# Patient Record
Sex: FEMALE | Race: WHITE | NOT HISPANIC OR LATINO | Employment: FULL TIME | URBAN - METROPOLITAN AREA
[De-identification: names, ages, dates, MRNs, and addresses within clinical notes are randomized per-mention and may not be internally consistent; named-entity substitution may affect disease eponyms.]

---

## 2018-12-01 ENCOUNTER — HOSPITAL ENCOUNTER (EMERGENCY)
Facility: HOSPITAL | Age: 58
Discharge: HOME | End: 2018-12-01
Attending: EMERGENCY MEDICINE | Admitting: EMERGENCY MEDICINE

## 2018-12-01 ENCOUNTER — APPOINTMENT (EMERGENCY)
Dept: RADIOLOGY | Facility: HOSPITAL | Age: 58
End: 2018-12-01

## 2018-12-01 ENCOUNTER — APPOINTMENT (EMERGENCY)
Dept: CT IMAGING | Facility: HOSPITAL | Age: 58
End: 2018-12-01

## 2018-12-01 VITALS
OXYGEN SATURATION: 97 % | SYSTOLIC BLOOD PRESSURE: 107 MMHG | HEART RATE: 59 BPM | RESPIRATION RATE: 16 BRPM | DIASTOLIC BLOOD PRESSURE: 58 MMHG | TEMPERATURE: 97.8 F

## 2018-12-01 DIAGNOSIS — R42 DIZZINESS: Primary | ICD-10-CM

## 2018-12-01 LAB
ALBUMIN SERPL BCP-MCNC: 3.9 G/DL (ref 3.5–5)
ALP SERPL-CCNC: 43 U/L (ref 46–116)
ALT SERPL W P-5'-P-CCNC: 26 U/L (ref 12–78)
ANION GAP SERPL CALCULATED.3IONS-SCNC: 12 MMOL/L (ref 4–13)
AST SERPL W P-5'-P-CCNC: 16 U/L (ref 5–45)
ATRIAL RATE: 51 BPM
ATRIAL RATE: 59 BPM
BASOPHILS # BLD AUTO: 0.09 THOUSANDS/ΜL (ref 0–0.1)
BASOPHILS NFR BLD AUTO: 2 % (ref 0–1)
BILIRUB SERPL-MCNC: 0.37 MG/DL (ref 0.2–1)
BUN SERPL-MCNC: 11 MG/DL (ref 5–25)
CALCIUM SERPL-MCNC: 8.6 MG/DL (ref 8.3–10.1)
CHLORIDE SERPL-SCNC: 98 MMOL/L (ref 100–108)
CO2 SERPL-SCNC: 26 MMOL/L (ref 21–32)
CREAT SERPL-MCNC: 0.7 MG/DL (ref 0.6–1.3)
EOSINOPHIL # BLD AUTO: 0.13 THOUSAND/ΜL (ref 0–0.61)
EOSINOPHIL NFR BLD AUTO: 2 % (ref 0–6)
ERYTHROCYTE [DISTWIDTH] IN BLOOD BY AUTOMATED COUNT: 11.6 % (ref 11.6–15.1)
ETHANOL SERPL-MCNC: 16 MG/DL (ref 0–3)
GFR SERPL CREATININE-BSD FRML MDRD: 96 ML/MIN/1.73SQ M
GLUCOSE SERPL-MCNC: 88 MG/DL (ref 65–140)
HCT VFR BLD AUTO: 42.1 % (ref 34.8–46.1)
HGB BLD-MCNC: 13.9 G/DL (ref 11.5–15.4)
IMM GRANULOCYTES # BLD AUTO: 0.01 THOUSAND/UL (ref 0–0.2)
IMM GRANULOCYTES NFR BLD AUTO: 0 % (ref 0–2)
LYMPHOCYTES # BLD AUTO: 1.14 THOUSANDS/ΜL (ref 0.6–4.47)
LYMPHOCYTES NFR BLD AUTO: 19 % (ref 14–44)
MCH RBC QN AUTO: 32.6 PG (ref 26.8–34.3)
MCHC RBC AUTO-ENTMCNC: 33 G/DL (ref 31.4–37.4)
MCV RBC AUTO: 99 FL (ref 82–98)
MONOCYTES # BLD AUTO: 0.59 THOUSAND/ΜL (ref 0.17–1.22)
MONOCYTES NFR BLD AUTO: 10 % (ref 4–12)
NEUTROPHILS # BLD AUTO: 3.97 THOUSANDS/ΜL (ref 1.85–7.62)
NEUTS SEG NFR BLD AUTO: 67 % (ref 43–75)
NRBC BLD AUTO-RTO: 0 /100 WBCS
P AXIS: 30 DEGREES
P AXIS: 55 DEGREES
PLATELET # BLD AUTO: 229 THOUSANDS/UL (ref 149–390)
PMV BLD AUTO: 8.6 FL (ref 8.9–12.7)
POTASSIUM SERPL-SCNC: 3.4 MMOL/L (ref 3.5–5.3)
PR INTERVAL: 188 MS
PR INTERVAL: 188 MS
PROT SERPL-MCNC: 7.4 G/DL (ref 6.4–8.2)
QRS AXIS: 36 DEGREES
QRS AXIS: 49 DEGREES
QRSD INTERVAL: 98 MS
QRSD INTERVAL: 98 MS
QT INTERVAL: 424 MS
QT INTERVAL: 476 MS
QTC INTERVAL: 419 MS
QTC INTERVAL: 438 MS
RBC # BLD AUTO: 4.26 MILLION/UL (ref 3.81–5.12)
SODIUM SERPL-SCNC: 136 MMOL/L (ref 136–145)
T WAVE AXIS: 32 DEGREES
T WAVE AXIS: 41 DEGREES
TROPONIN I SERPL-MCNC: 0.02 NG/ML
TROPONIN I SERPL-MCNC: 0.03 NG/ML
VENTRICULAR RATE: 51 BPM
VENTRICULAR RATE: 59 BPM
WBC # BLD AUTO: 5.93 THOUSAND/UL (ref 4.31–10.16)

## 2018-12-01 PROCEDURE — 99285 EMERGENCY DEPT VISIT HI MDM: CPT

## 2018-12-01 PROCEDURE — 70496 CT ANGIOGRAPHY HEAD: CPT

## 2018-12-01 PROCEDURE — 36415 COLL VENOUS BLD VENIPUNCTURE: CPT

## 2018-12-01 PROCEDURE — 80053 COMPREHEN METABOLIC PANEL: CPT | Performed by: EMERGENCY MEDICINE

## 2018-12-01 PROCEDURE — 71046 X-RAY EXAM CHEST 2 VIEWS: CPT

## 2018-12-01 PROCEDURE — 70498 CT ANGIOGRAPHY NECK: CPT

## 2018-12-01 PROCEDURE — 93010 ELECTROCARDIOGRAM REPORT: CPT | Performed by: INTERNAL MEDICINE

## 2018-12-01 PROCEDURE — 85025 COMPLETE CBC W/AUTO DIFF WBC: CPT | Performed by: EMERGENCY MEDICINE

## 2018-12-01 PROCEDURE — 93005 ELECTROCARDIOGRAM TRACING: CPT

## 2018-12-01 PROCEDURE — 96360 HYDRATION IV INFUSION INIT: CPT

## 2018-12-01 PROCEDURE — 84484 ASSAY OF TROPONIN QUANT: CPT | Performed by: EMERGENCY MEDICINE

## 2018-12-01 PROCEDURE — 80320 DRUG SCREEN QUANTALCOHOLS: CPT | Performed by: EMERGENCY MEDICINE

## 2018-12-01 RX ORDER — MECLIZINE HCL 12.5 MG/1
25 TABLET ORAL ONCE
Status: COMPLETED | OUTPATIENT
Start: 2018-12-01 | End: 2018-12-01

## 2018-12-01 RX ORDER — MECLIZINE HCL 12.5 MG/1
25 TABLET ORAL 3 TIMES DAILY PRN
Qty: 10 TABLET | Refills: 0 | Status: SHIPPED | OUTPATIENT
Start: 2018-12-01

## 2018-12-01 RX ADMIN — MECLIZINE 25 MG: 12.5 TABLET ORAL at 15:47

## 2018-12-01 RX ADMIN — SODIUM CHLORIDE 1000 ML: 0.9 INJECTION, SOLUTION INTRAVENOUS at 15:45

## 2018-12-01 RX ADMIN — IOHEXOL 85 ML: 350 INJECTION, SOLUTION INTRAVENOUS at 16:19

## 2018-12-01 NOTE — DISCHARGE INSTRUCTIONS
Dizziness, Ambulatory Care   GENERAL INFORMATION:   Dizziness  is a term used to describe a feeling of being off balance or unsteady  Common causes of dizziness are an inner ear fluid imbalance or a lack of oxygen in your blood  Your dizziness may be acute (lasts 3 days or less) or chronic (lasts longer than 3 days)  You may have dizzy spells that last from seconds to a few hours  Common symptoms related to dizziness include the following:   · A feeling that your surroundings are moving even though you are standing still    · Ringing in your ears or hearing loss     · Feeling faint or lightheaded     · Weakness or unsteadiness     · Double vision or eye movements you cannot control    · Nausea or vomiting     · Confusion  Seek immediate care for the following symptoms:   · You have a headache that does not go away with medicine  · You have shaking chills and a fever  · You vomit over and over with no relief  · Your vomit or bowel movements are red or black  · You have pain in your chest, back, or abdomen  · You have numbness, especially in your face, arms, or legs  · You have trouble moving your arms or legs  Treatment for dizziness  depends on the cause of your dizziness  You may need medicines to decrease your dizziness or nausea  You may need to be admitted to the hospital for treatment  Manage your symptoms:  Get up slowly from sitting or lying down  Do not drive or operate machinery when you are dizzy  Follow up with your healthcare provider as directed:  Write down your questions so you remember to ask them during your visits  CARE AGREEMENT:   You have the right to help plan your care  Learn about your health condition and how it may be treated  Discuss treatment options with your caregivers to decide what care you want to receive  You always have the right to refuse treatment  The above information is an  only   It is not intended as medical advice for individual conditions or treatments  Talk to your doctor, nurse or pharmacist before following any medical regimen to see if it is safe and effective for you  © 2014 [address] is for End User's use only and may not be sold, redistributed or otherwise used for commercial purposes  All illustrations and images included in CareNotes® are the copyrighted property of A D A M , Inc  or Abiodun Farfan

## 2018-12-01 NOTE — ED PROVIDER NOTES
History  Chief Complaint   Patient presents with    Dizziness     Externial dizziness that started this morning when "running errands"  Same situation happened on 11/28/2018  Reports to have drank one beer today  History provided by:  Patient   used: No    Dizziness   Quality:  Lightheadedness  Severity:  Moderate  Onset quality:  Gradual  Duration:  3 days  Timing:  Intermittent  Progression:  Waxing and waning  Chronicity:  New  Context: not with loss of consciousness    Relieved by:  Nothing  Worsened by:  Nothing  Ineffective treatments:  None tried  Associated symptoms: no blood in stool, no chest pain, no diarrhea, no headaches, no nausea, no shortness of breath and no vomiting    Risk factors: no heart disease and no multiple medications        None       History reviewed  No pertinent past medical history  History reviewed  No pertinent surgical history  History reviewed  No pertinent family history  I have reviewed and agree with the history as documented  Social History   Substance Use Topics    Smoking status: Current Every Day Smoker    Smokeless tobacco: Not on file    Alcohol use Yes      Comment: occasionally        Review of Systems   Constitutional: Negative for chills and fever  HENT: Negative for facial swelling, sore throat and trouble swallowing  Eyes: Negative for pain and visual disturbance  Respiratory: Negative for cough and shortness of breath  Cardiovascular: Negative for chest pain and leg swelling  Gastrointestinal: Negative for abdominal pain, blood in stool, diarrhea, nausea and vomiting  Genitourinary: Negative for dysuria and flank pain  Musculoskeletal: Negative for back pain, neck pain and neck stiffness  Skin: Negative for pallor and rash  Allergic/Immunologic: Negative for environmental allergies and immunocompromised state  Neurological: Positive for dizziness  Negative for headaches     Hematological: Negative for adenopathy  Does not bruise/bleed easily  Psychiatric/Behavioral: Negative for agitation and behavioral problems  All other systems reviewed and are negative  Physical Exam  Physical Exam   Constitutional: She is oriented to person, place, and time  She appears well-developed and well-nourished  No distress  HENT:   Head: Normocephalic and atraumatic  Eyes: Pupils are equal, round, and reactive to light  EOM are normal    Neck: Normal range of motion  Neck supple  Cardiovascular: Normal rate, regular rhythm, normal heart sounds and intact distal pulses  Pulmonary/Chest: Effort normal and breath sounds normal    Abdominal: Soft  Bowel sounds are normal  There is no tenderness  There is no rebound and no guarding  Musculoskeletal: Normal range of motion  Neurological: She is alert and oriented to person, place, and time  No cranial nerve deficit  Coordination normal    Skin: Skin is warm and dry  Psychiatric: She has a normal mood and affect  Nursing note and vitals reviewed        Vital Signs  ED Triage Vitals   Temperature Pulse Respirations Blood Pressure SpO2   12/01/18 1422 12/01/18 1422 12/01/18 1422 12/01/18 1422 12/01/18 1422   97 8 °F (36 6 °C) 67 18 166/85 95 %      Temp Source Heart Rate Source Patient Position - Orthostatic VS BP Location FiO2 (%)   12/01/18 1422 12/01/18 1422 12/01/18 1535 12/01/18 1535 --   Oral Monitor Lying - Orthostatic VS Left arm       Pain Score       12/01/18 1422       No Pain           Vitals:    12/01/18 1535 12/01/18 1537 12/01/18 1538 12/01/18 1731   BP: 124/69 134/74 129/76 107/58   Pulse: 57 55 66 59   Patient Position - Orthostatic VS: Lying - Orthostatic VS Sitting - Orthostatic VS Standing - Orthostatic VS        Visual Acuity  Visual Acuity      Most Recent Value   L Pupil Size (mm)  3   R Pupil Size (mm)  3          ED Medications  Medications   meclizine (ANTIVERT) tablet 25 mg (25 mg Oral Given 12/1/18 4837)   sodium chloride 0 9 % bolus 1,000 mL (0 mL Intravenous Stopped 12/1/18 1650)   iohexol (OMNIPAQUE) 350 MG/ML injection (MULTI-DOSE) 85 mL (85 mL Intravenous Given 12/1/18 1619)       Diagnostic Studies  Results Reviewed     Procedure Component Value Units Date/Time    Troponin I [[id number]]  (Normal) Collected:  12/01/18 1731    Lab Status:  Final result Specimen:  Blood from Arm, Right Updated:  12/01/18 1754     Troponin I 0 02 ng/mL     Troponin I [[id number]]  (Normal) Collected:  12/01/18 1432    Lab Status:  Final result Specimen:  Blood from Arm, Right Updated:  12/01/18 1509     Troponin I 0 03 ng/mL     Ethanol [[id number]]  (Abnormal) Collected:  12/01/18 1432    Lab Status:  Final result Specimen:  Blood from Arm, Right Updated:  12/01/18 1502     Ethanol Lvl 16 (H) mg/dL     Comprehensive metabolic panel [[id number]]  (Abnormal) Collected:  12/01/18 1432    Lab Status:  Final result Specimen:  Blood from Arm, Right Updated:  12/01/18 1459     Sodium 136 mmol/L      Potassium 3 4 (L) mmol/L      Chloride 98 (L) mmol/L      CO2 26 mmol/L      ANION GAP 12 mmol/L      BUN 11 mg/dL      Creatinine 0 70 mg/dL      Glucose 88 mg/dL      Calcium 8 6 mg/dL      AST 16 U/L      ALT 26 U/L      Alkaline Phosphatase 43 (L) U/L      Total Protein 7 4 g/dL      Albumin 3 9 g/dL      Total Bilirubin 0 37 mg/dL      eGFR 96 ml/min/1 73sq m     Narrative:         National Kidney Disease Education Program recommendations are as follows:  GFR calculation is accurate only with a steady state creatinine  Chronic Kidney disease less than 60 ml/min/1 73 sq  meters  Kidney failure less than 15 ml/min/1 73 sq  meters      CBC and differential [[id number]]  (Abnormal) Collected:  12/01/18 1432    Lab Status:  Final result Specimen:  Blood from Arm, Right Updated:  12/01/18 1440     WBC 5 93 Thousand/uL      RBC 4 26 Million/uL      Hemoglobin 13 9 g/dL      Hematocrit 42 1 %      MCV 99 (H) fL      MCH 32 6 pg      MCHC 33 0 g/dL      RDW 11 6 %      MPV 8 6 (L) fL      Platelets 761 Thousands/uL      nRBC 0 /100 WBCs      Neutrophils Relative 67 %      Immat GRANS % 0 %      Lymphocytes Relative 19 %      Monocytes Relative 10 %      Eosinophils Relative 2 %      Basophils Relative 2 (H) %      Neutrophils Absolute 3 97 Thousands/µL      Immature Grans Absolute 0 01 Thousand/uL      Lymphocytes Absolute 1 14 Thousands/µL      Monocytes Absolute 0 59 Thousand/µL      Eosinophils Absolute 0 13 Thousand/µL      Basophils Absolute 0 09 Thousands/µL                  CTA head and neck w wo contrast   Final Result by Dimas Jane MD (12/01 1649)      No evidence of acute intracranial hemorrhage  No evidence of hemodynamic significant stenosis, aneurysm or dissection  Left maxillary molar periapical lucency/abscess with multiple tooth caries  Workstation performed: [id number]         X-ray chest 2 views   Final Result by Dimas Jane MD (12/01 1549)      No acute cardiopulmonary disease  Workstation performed: [id number]                    Procedures  ECG 12 Lead Documentation  Date/Time: 12/1/2018 5:06 PM  Performed by: Yanni Torre  Authorized by: Yanni Torre     Indications / Diagnosis:  Dizziness  ECG reviewed by me, the ED Provider: yes    Patient location:  ED  Interpretation:     Interpretation: normal    Rate:     ECG rate:  59    ECG rate assessment: normal    Rhythm:     Rhythm: sinus bradycardia    Ectopy:     Ectopy: none    QRS:     QRS axis:  Normal  Conduction:     Conduction: normal    ST segments:     ST segments:  Normal  T waves:     T waves: normal             Phone Contacts  ED Phone Contact    ED Course  ED Course as of Dec 01 1839   Sat Dec 01, 2018   1642 Troponin I: 0 03   1642 Hemoglobin: 13 9   1642 Labs reviewed, CBC, CMP within normal limits; troponin level 0 03; alcohol 16  Patient informed about the results, will do a 3 hour delta troponin and EKG   MEDICAL ALCOHOL: (!) 16   1629 Delta troponin and EKG done, troponin 0 02/negative; EKG shows sinus bradycardia, 51 beats per minute, no acute changes, unchanged from prior EKG  Troponin I: 0 02   1759 Patient re-evaluated, feels better, ambulated to bathroom without difficulty  Informed about the extensive ER workup results including labs, CTA head and neck, chest x-ray  No acute abnormality at this point  Symptoms possibly related to nonspecific dizziness versus BPPV; patient felt better after meclizine, we will discharge on meclizine, advise close follow-up with PCP, see a dentist for her dental caries on CT                                 MDM  Number of Diagnoses or Management Options  Dizziness: new and requires workup  Diagnosis management comments: Patient is a 79-year-old female, comes in with complaints of dizziness, going on for past 3 days, intermittent, states that she has been feeling unsteady; states that it started at work couple of days back, this checked her glucose that was low, ate something and it came up to 80s; today again had a similar symptoms, denies syncope, headache, chest pain, dyspnea, abdominal pain or back pain  On exam no acute distress:  Vital signs stable, pupils equal round reactive to light, no nystagmus, normal cranial nerve and neuro exam, no focal deficits, lungs clear, cardiovascular normal heart sounds, abdomen soft nontender, no peripheral edema, no calf tenderness or swelling  Differential diagnosis:  Nonspecific dizziness, vertigo, BPPV, patient has he a skin mass between right eye and nasal bridge, unclear etiology, never evaluated, rule out intracranial mass; doubt stroke, but due to recurrent episodes of dizziness, posterior circulation insufficiency would be in differential, we will evaluate with CTA head and neck, check labs including CBC, CMP, EKG, troponin to evaluate for any cardiac etiology, dehydration, anemia, will check urine to rule out UTI         Amount and/or Complexity of Data Reviewed  Clinical lab tests: reviewed and ordered  Tests in the radiology section of CPT®: ordered and reviewed  Tests in the medicine section of CPT®: ordered and reviewed  Independent visualization of images, tracings, or specimens: yes      CritCare Time    Disposition  Final diagnoses:   Dizziness     Time reflects when diagnosis was documented in both MDM as applicable and the Disposition within this note     Time User Action Codes Description Comment    12/1/2018  5:10 PM Linn Caal Add [R42] Dizziness       ED Disposition     ED Disposition Condition Comment    Discharge  Vasquez Halt discharge to home/self care  Condition at discharge: Stable        Follow-up Information     Follow up With Specialties Details Why Contact Info Additional [address] [facility]   [address] [facility] [address] 042 Charbel Rodriguez [address], [address]    [address] [place] [address]    [phone]             Discharge Medication List as of 12/1/2018  6:08 PM      START taking these medications    Details   meclizine (ANTIVERT) 12 5 MG tablet Take 2 tablets (25 mg total) by mouth 3 (three) times a day as needed for dizziness, Starting Sat 12/1/2018, Print           No discharge procedures on file      ED Provider  Electronically Signed by           Michelle Mcintyre MD  12/01/18 9032

## 2022-04-21 ENCOUNTER — HOSPITAL ENCOUNTER (EMERGENCY)
Facility: HOSPITAL | Age: 62
Discharge: HOME | End: 2022-04-21
Attending: EMERGENCY MEDICINE

## 2022-04-21 VITALS
RESPIRATION RATE: 18 BRPM | TEMPERATURE: 97.6 F | DIASTOLIC BLOOD PRESSURE: 81 MMHG | HEART RATE: 61 BPM | SYSTOLIC BLOOD PRESSURE: 179 MMHG | OXYGEN SATURATION: 95 %

## 2022-04-21 DIAGNOSIS — R42 DIZZINESS: Primary | ICD-10-CM

## 2022-04-21 LAB
ANION GAP SERPL CALCULATED.3IONS-SCNC: 8 MMOL/L (ref 4–13)
ATRIAL RATE: 55 BPM
BASOPHILS # BLD AUTO: 0.06 THOUSANDS/ΜL (ref 0–0.1)
BASOPHILS NFR BLD AUTO: 1 % (ref 0–1)
BUN SERPL-MCNC: 15 MG/DL (ref 5–25)
CALCIUM SERPL-MCNC: 8.9 MG/DL (ref 8.3–10.1)
CARDIAC TROPONIN I PNL SERPL HS: 3 NG/L
CHLORIDE SERPL-SCNC: 102 MMOL/L (ref 100–108)
CO2 SERPL-SCNC: 28 MMOL/L (ref 21–32)
CREAT SERPL-MCNC: 0.73 MG/DL (ref 0.6–1.3)
EOSINOPHIL # BLD AUTO: 0.13 THOUSAND/ΜL (ref 0–0.61)
EOSINOPHIL NFR BLD AUTO: 2 % (ref 0–6)
ERYTHROCYTE [DISTWIDTH] IN BLOOD BY AUTOMATED COUNT: 12.3 % (ref 11.6–15.1)
GFR SERPL CREATININE-BSD FRML MDRD: 88 ML/MIN/1.73SQ M
GLUCOSE SERPL-MCNC: 94 MG/DL (ref 65–140)
HCT VFR BLD AUTO: 42.2 % (ref 34.8–46.1)
HGB BLD-MCNC: 14.4 G/DL (ref 11.5–15.4)
IMM GRANULOCYTES # BLD AUTO: 0.01 THOUSAND/UL (ref 0–0.2)
IMM GRANULOCYTES NFR BLD AUTO: 0 % (ref 0–2)
LYMPHOCYTES # BLD AUTO: 0.81 THOUSANDS/ΜL (ref 0.6–4.47)
LYMPHOCYTES NFR BLD AUTO: 15 % (ref 14–44)
MCH RBC QN AUTO: 32.9 PG (ref 26.8–34.3)
MCHC RBC AUTO-ENTMCNC: 34.1 G/DL (ref 31.4–37.4)
MCV RBC AUTO: 96 FL (ref 82–98)
MONOCYTES # BLD AUTO: 0.62 THOUSAND/ΜL (ref 0.17–1.22)
MONOCYTES NFR BLD AUTO: 12 % (ref 4–12)
NEUTROPHILS # BLD AUTO: 3.76 THOUSANDS/ΜL (ref 1.85–7.62)
NEUTS SEG NFR BLD AUTO: 70 % (ref 43–75)
NRBC BLD AUTO-RTO: 0 /100 WBCS
P AXIS: 26 DEGREES
PLATELET # BLD AUTO: 226 THOUSANDS/UL (ref 149–390)
PMV BLD AUTO: 8.5 FL (ref 8.9–12.7)
POTASSIUM SERPL-SCNC: 3.6 MMOL/L (ref 3.5–5.3)
PR INTERVAL: 200 MS
QRS AXIS: 45 DEGREES
QRSD INTERVAL: 86 MS
QT INTERVAL: 436 MS
QTC INTERVAL: 417 MS
RBC # BLD AUTO: 4.38 MILLION/UL (ref 3.81–5.12)
SODIUM SERPL-SCNC: 138 MMOL/L (ref 136–145)
T WAVE AXIS: 64 DEGREES
VENTRICULAR RATE: 55 BPM
WBC # BLD AUTO: 5.39 THOUSAND/UL (ref 4.31–10.16)

## 2022-04-21 PROCEDURE — 99284 EMERGENCY DEPT VISIT MOD MDM: CPT

## 2022-04-21 PROCEDURE — 85025 COMPLETE CBC W/AUTO DIFF WBC: CPT | Performed by: EMERGENCY MEDICINE

## 2022-04-21 PROCEDURE — 99285 EMERGENCY DEPT VISIT HI MDM: CPT | Performed by: EMERGENCY MEDICINE

## 2022-04-21 PROCEDURE — 80048 BASIC METABOLIC PNL TOTAL CA: CPT | Performed by: EMERGENCY MEDICINE

## 2022-04-21 PROCEDURE — 96360 HYDRATION IV INFUSION INIT: CPT

## 2022-04-21 PROCEDURE — 36415 COLL VENOUS BLD VENIPUNCTURE: CPT | Performed by: EMERGENCY MEDICINE

## 2022-04-21 PROCEDURE — 93010 ELECTROCARDIOGRAM REPORT: CPT | Performed by: INTERNAL MEDICINE

## 2022-04-21 PROCEDURE — 84484 ASSAY OF TROPONIN QUANT: CPT | Performed by: EMERGENCY MEDICINE

## 2022-04-21 PROCEDURE — 93005 ELECTROCARDIOGRAM TRACING: CPT

## 2022-04-21 RX ORDER — MECLIZINE HCL 12.5 MG/1
12.5 TABLET ORAL ONCE
Status: COMPLETED | OUTPATIENT
Start: 2022-04-21 | End: 2022-04-21

## 2022-04-21 RX ORDER — MECLIZINE HYDROCHLORIDE 25 MG/1
25 TABLET ORAL ONCE
Status: COMPLETED | OUTPATIENT
Start: 2022-04-21 | End: 2022-04-21

## 2022-04-21 RX ORDER — MECLIZINE HYDROCHLORIDE 25 MG/1
25 TABLET ORAL EVERY 8 HOURS PRN
Qty: 20 TABLET | Refills: 0 | Status: SHIPPED | OUTPATIENT
Start: 2022-04-21

## 2022-04-21 RX ADMIN — SODIUM CHLORIDE 1000 ML: 0.9 INJECTION, SOLUTION INTRAVENOUS at 12:09

## 2022-04-21 RX ADMIN — MECLIZINE HYDROCHLORIDE 25 MG: 25 TABLET ORAL at 12:08

## 2022-04-21 RX ADMIN — MECLIZINE 12.5 MG: 12.5 TABLET ORAL at 13:08

## 2022-04-21 NOTE — ED PROVIDER NOTES
History  Chief Complaint   Patient presents with    Dizziness     pt reports dizziness beginning 1 hr pta  RN at work checked blood pressure and noted high blood pressure  Pt unsure of number  Denies chest pain or sob     A 58 yo female with pmhx of psoriasis; presents with dizziness that began suddenly while at work one hour prior to arrival   Pt describes the dizziness as she felt she was spinning  Patient states at rest the dizziness is improved, however when attempting to stand or ambulate the dizziness recurs  Patient denies associated headache, visual disturbances, fever, chills, chest pain, shortness of breath, abdominal pain, nausea, vomiting, diarrhea, peripheral edema, paresthesias and focal weakness  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 and found it to be elevated, however she is unsure on the exact numbers and denies a history of hypertension  Patient does report a similar episode of dizziness several years ago, however it has not recurred until today  On review, patient was seen in the ED for that episode (December 2018) undergoing lab work and CTA head/neck which was within normal limits  Patient was treated with meclizine and discharged home  A/P: Dizziness, described as vertiginous  Pt does have subtle horizontal nystagmus to the right  Remainder of neuro exam within normal limits  Suspect likely peripheral nature  Will check basic labs and treat symptomatically  History provided by:  Patient and medical records  Dizziness      Prior to Admission Medications   Prescriptions Last Dose Informant Patient Reported? Taking?   meclizine (ANTIVERT) 12 5 MG tablet   No No   Sig: Take 2 tablets (25 mg total) by mouth 3 (three) times a day as needed for dizziness      Facility-Administered Medications: None       Past Medical History:   Diagnosis Date    Psoriasis        History reviewed  No pertinent surgical history  History reviewed   No pertinent family history  I have reviewed and agree with the history as documented  E-Cigarette/Vaping     E-Cigarette/Vaping Substances     Social History     Tobacco Use    Smoking status: Current Every Day Smoker     Packs/day: 0 50     Types: Cigarettes    Smokeless tobacco: Never Used   Substance Use Topics    Alcohol use: Yes     Comment: occasionally    Drug use: No       Review of Systems   Neurological: Positive for dizziness  All other systems reviewed and are negative  Physical Exam  Physical Exam  General Appearance: alert and oriented, nad, non toxic appearing  Skin:  Warm, dry, intact  HEENT: atraumatic, normocephalic  PERRLA, EOMI  Subtle horizontal nystagmus to the right  Neck: Supple, trachea midline  Cardiac: RRR; no murmurs, rub, gallops  Pulmonary: lungs CTAB; no wheezes, rales, rhonchi  Gastrointestinal: abdomen soft, nontender, nondistended; no guarding or rebound tenderness; good bowel sounds, no mass or bruits  Extremities:  no pedal edema, 2+ pulses; no calf tenderness, no clubbing, no cyanosis  Neuro:  CN 2-12 grossly intact  5/5 motor strength to bilateral upper and lower extremities  Sensation in tact throughout  Normal finger to nose to bilateral upper extremities  Normal heel-shin to bilateral lower extremities  No pronator drift    Psych:  Normal mood and affect, normal judgement and insight      Vital Signs  ED Triage Vitals [04/21/22 1145]   Temperature Pulse Respirations Blood Pressure SpO2   97 6 °F (36 4 °C) 61 18 (!) 179/81 95 %      Temp Source Heart Rate Source Patient Position - Orthostatic VS BP Location FiO2 (%)   Oral Monitor Lying Right arm --      Pain Score       No Pain           Vitals:    04/21/22 1145   BP: (!) 179/81   Pulse: 61   Patient Position - Orthostatic VS: Lying         Visual Acuity  Visual Acuity      Most Recent Value   L Pupil Size (mm) 3   R Pupil Size (mm) 3          ED Medications  Medications   sodium chloride 0 9 % bolus 1,000 mL (0 mL Intravenous Stopped 4/21/22 1308)   meclizine (ANTIVERT) tablet 25 mg (25 mg Oral Given 4/21/22 1208)   meclizine (ANTIVERT) tablet 12 5 mg (12 5 mg Oral Given 4/21/22 1308)       Diagnostic Studies  Results Reviewed     Procedure Component Value Units Date/Time    HS Troponin 0hr (reflex protocol) [[id number]]  (Normal) Collected: 04/21/22 1207    Lab Status: Final result Specimen: Blood from Arm, Left Updated: 04/21/22 1238     hs TnI 0hr 3 ng/L     Basic metabolic panel [[id number]] Collected: 04/21/22 1207    Lab Status: Final result Specimen: Blood from Arm, Left Updated: 04/21/22 1226     Sodium 138 mmol/L      Potassium 3 6 mmol/L      Chloride 102 mmol/L      CO2 28 mmol/L      ANION GAP 8 mmol/L      BUN 15 mg/dL      Creatinine 0 73 mg/dL      Glucose 94 mg/dL      Calcium 8 9 mg/dL      eGFR 88 ml/min/1 73sq m     Narrative:      [facility] guidelines for Chronic Kidney Disease (CKD):     Stage 1 with normal or high GFR (GFR > 90 mL/min/1 73 square meters)    Stage 2 Mild CKD (GFR = 60-89 mL/min/1 73 square meters)    Stage 3A Moderate CKD (GFR = 45-59 mL/min/1 73 square meters)    Stage 3B Moderate CKD (GFR = 30-44 mL/min/1 73 square meters)    Stage 4 Severe CKD (GFR = 15-29 mL/min/1 73 square meters)    Stage 5 End Stage CKD (GFR <15 mL/min/1 73 square meters)  Note: GFR calculation is accurate only with a steady state creatinine    CBC and differential [[id number]]  (Abnormal) Collected: 04/21/22 1207    Lab Status: Final result Specimen: Blood from Arm, Left Updated: 04/21/22 1215     WBC 5 39 Thousand/uL      RBC 4 38 Million/uL      Hemoglobin 14 4 g/dL      Hematocrit 42 2 %      MCV 96 fL      MCH 32 9 pg      MCHC 34 1 g/dL      RDW 12 3 %      MPV 8 5 fL      Platelets 630 Thousands/uL      nRBC 0 /100 WBCs      Neutrophils Relative 70 %      Immat GRANS % 0 %      Lymphocytes Relative 15 %      Monocytes Relative 12 %      Eosinophils Relative 2 %      Basophils Relative 1 %      Neutrophils Absolute 3 76 Thousands/µL      Immature Grans Absolute 0 01 Thousand/uL      Lymphocytes Absolute 0 81 Thousands/µL      Monocytes Absolute 0 62 Thousand/µL      Eosinophils Absolute 0 13 Thousand/µL      Basophils Absolute 0 06 Thousands/µL                  No orders to display              Procedures  Procedures   ECG 12 Lead Documentation  Date/Time: today/date: 4/21/2022  Performed by: Reyna Gottlieb    ECG reviewed by me, the ED Provider: yes    Patient location:  ED   Previous ECG:  Compared to current, no change   Rate:  55  ECG rate assessment: normal    Rhythm: sinus bradycardia    Ectopy:  none    QRS axis:  Normal  Intervals: normal   Q waves: None   ST segments:  Normal  T waves: normal      Impression:  Sinus bradycardia, otherwise normal EKG          ED Course  ED Course as of 04/21/22 1519   Thu Apr 21, 2022   1239 hs TnI 0hr: 3  Pt without chest pain, no additional trending needed   1242 Remainder of labs WNL   1248 Pt updated on lab results  Reports some improvement in her symptoms following meclizine, although still present  Will give additional dose of meclizine and re-assess  1329 Pt feeling better at this time  Will have patient ambulate prior to discharge  1335 Patient able to ambulate without recurrence of symptoms  Will proceed with discharge home, recommending continued symptomatic treatment  Also discussed patient's elevated blood pressures with her, stating she will need to follow up as an outpatient for further diagnosis and management  Patient does not have a current PCP, will provide family health clinic information  SBIRT 22yo+      Most Recent Value   SBIRT (24 yo +)    In order to provide better care to our patients, we are screening all of our patients for alcohol and drug use  Would it be okay to ask you these screening questions? No Filed at: 04/21/2022 1228   Initial Alcohol Screen: US AUDIT-C     1   How often do you have a drink containing alcohol? 0 Filed at: 04/21/2022 1228   2  How many drinks containing alcohol do you have on a typical day you are drinking? 0 Filed at: 04/21/2022 1228   3a  Male UNDER 65: How often do you have five or more drinks on one occasion? 0 Filed at: 04/21/2022 1228   3b  FEMALE Any Age, or MALE 65+: How often do you have 4 or more drinks on one occassion? 0 Filed at: 04/21/2022 1228   Audit-C Score 0 Filed at: 04/21/2022 1228                    MDM    Disposition  Final diagnoses:   Dizziness     Time reflects when diagnosis was documented in both MDM as applicable and the Disposition within this note     Time User Action Codes Description Comment    4/21/2022  1:36 PM Gold Varela Add [R42] Dizziness       ED Disposition     ED Disposition Condition Date/Time Comment    Discharge Stable u Apr 21, 2022  1:36 PM Yenifer Strickland discharge to home/self care  Follow-up Information     Follow up With Specialties Details Why Contact Info Additional [address] [facility] Schedule an appointment as soon as possible for a visit  To establish care with a family physician [address], [address] [facility] [id number] [place] [address], [address], [address], [facility] [address]          Discharge Medication List as of 4/21/2022  1:37 PM      START taking these medications    Details   !! meclizine (ANTIVERT) 25 mg tablet Take 1 tablet (25 mg total) by mouth every 8 (eight) hours as needed for dizziness, Starting Thu 4/21/2022, Print       !! - Potential duplicate medications found  Please discuss with provider  CONTINUE these medications which have NOT CHANGED    Details   !! meclizine (ANTIVERT) 12 5 MG tablet Take 2 tablets (25 mg total) by mouth 3 (three) times a day as needed for dizziness, Starting Sat 12/1/2018, Print       ! ! - Potential duplicate medications found  Please discuss with provider  No discharge procedures on file      PDMP Review     None          ED Provider  Electronically Signed by           Hi Montague DO  04/21/22 6125

## 2022-04-21 NOTE — Clinical Note
Yoel Resendiz was seen and treated in our emergency department on 4/21/2022  Diagnosis:     Lexii  may return to work on return date  She may return on this date: 04/23/2022         If you have any questions or concerns, please don't hesitate to call        Vanessa    ______________________________           _______________          _______________  Hospital Representative                              Date                                Time